# Patient Record
Sex: MALE | Employment: FULL TIME | URBAN - METROPOLITAN AREA
[De-identification: names, ages, dates, MRNs, and addresses within clinical notes are randomized per-mention and may not be internally consistent; named-entity substitution may affect disease eponyms.]

---

## 2024-03-02 ENCOUNTER — HOSPITAL ENCOUNTER (OUTPATIENT)
Age: 54
Discharge: HOME | End: 2024-03-02
Attending: EMERGENCY MEDICINE
Payer: COMMERCIAL

## 2024-03-02 VITALS
RESPIRATION RATE: 20 BRPM | OXYGEN SATURATION: 97 % | HEART RATE: 56 BPM | DIASTOLIC BLOOD PRESSURE: 72 MMHG | SYSTOLIC BLOOD PRESSURE: 113 MMHG | TEMPERATURE: 98 F

## 2024-03-02 DIAGNOSIS — H60.502 ACUTE OTITIS EXTERNA OF LEFT EAR, UNSPECIFIED TYPE: Primary | ICD-10-CM

## 2024-03-02 PROCEDURE — 99203 OFFICE O/P NEW LOW 30 MIN: CPT

## 2024-03-02 RX ORDER — CIPROFLOXACIN AND DEXAMETHASONE 3; 1 MG/ML; MG/ML
4 SUSPENSION/ DROPS AURICULAR (OTIC) 2 TIMES DAILY
Qty: 7.5 ML | Refills: 0 | Status: SHIPPED | OUTPATIENT
Start: 2024-03-02 | End: 2024-03-09

## 2024-03-02 NOTE — ED PROVIDER NOTES
Patient Seen in: [facility]      History     Chief Complaint   Patient presents with    Ear Problem Pain     Stated Complaint: R ear pain    Subjective:   HPI    55 yo male with chronic left ear pain, this episode started 4 days ago. No illness or trauma. No change in hearing. No other complaints.     Objective:   History reviewed. No pertinent past medical history.           History reviewed. No pertinent surgical history.             Social History     Socioeconomic History    Marital status:               Review of Systems    Positive for stated complaint: R ear pain  Other systems are as noted in HPI.  Constitutional and vital signs reviewed.      All other systems reviewed and negative except as noted above.    Physical Exam     ED Triage Vitals [03/02/24 1034]   /72   Pulse 56   Resp 20   Temp 98 °F (36.7 °C)   Temp src Temporal   SpO2 97 %   O2 Device None (Room air)       Current:/72   Pulse 56   Temp 98 °F (36.7 °C) (Temporal)   Resp 20   SpO2 97%         Physical Exam  Vitals and nursing note reviewed.   Constitutional:       Appearance: Normal appearance. He is well-developed.   HENT:      Head: Normocephalic and atraumatic.      Right Ear: Tympanic membrane, ear canal and external ear normal.      Ears:      Comments: Left ear canal is erythematous, swollen and tender. TM is normal.   Cardiovascular:      Rate and Rhythm: Normal rate and regular rhythm.   Pulmonary:      Effort: Pulmonary effort is normal. No respiratory distress.   Skin:     General: Skin is warm and dry.      Capillary Refill: Capillary refill takes less than 2 seconds.   Neurological:      General: No focal deficit present.      Mental Status: He is alert.      Sensory: No sensory deficit.   Psychiatric:         Mood and Affect: Mood normal.         Behavior: Behavior normal.              ED Course   Labs Reviewed - No data to display                   MDM                                       Medical Decision Making  Otitis media, otitis externa, cerumen all in differential. Exam findings consistent with otitis externa. Discharge on ciprodex and otc ibuprofen for pain.     Disposition and Plan     Clinical Impression:  1. Acute otitis externa of left ear, unspecified type         Disposition:  Discharge  3/2/2024 10:38 am    Follow-up:  Suzy Sanders MD  [address]  [place] [address]  [phone]      As needed          Medications Prescribed:  Discharge Medication List as of 3/2/2024 10:39 AM        START taking these medications    Details   ciprofloxacin-dexamethasone 0.3-0.1 % Otic Suspension Place 4 drops into the left ear 2 (two) times daily for 7 days., Print, Disp-7.5 mL, R-0

## 2024-03-14 ENCOUNTER — OFFICE VISIT (OUTPATIENT)
Dept: OTOLARYNGOLOGY | Facility: CLINIC | Age: 54
End: 2024-03-14

## 2024-03-14 VITALS — TEMPERATURE: 98 F

## 2024-03-14 DIAGNOSIS — M26.609 TMJ (TEMPOROMANDIBULAR JOINT DISORDER): Primary | ICD-10-CM

## 2024-03-14 DIAGNOSIS — H92.02 LEFT EAR PAIN: ICD-10-CM

## 2024-03-14 NOTE — PROGRESS NOTES
Henrique Simmons is a 54 year old male.   Chief Complaint   Patient presents with    Ear Problem     Pt presents with left ear pain for 1 week. Was seen in [facility] on 3/2 was given Cipro/Dexameth. Did not complete treatment due to no improvement.       ASSESSMENT AND PLAN:   1. TMJ (temporomandibular joint disorder)      2. Left ear pain  54-year-old presents with severe left ear pain.  He notes a couple dental procedures in the last couple weeks.  This primarily bothers him at nighttime.  He has used some Ciprodex drops    His otologic exam is normal there is no effusion or otitis of the ear canal or middle ear space.  He did have tenderness of the left temporomandibular joint    Reassured him of the normal ear exam.  Rather his ear pain may be referred from his job with respect to his previous dental surgeries in the recent past.  He may want to try 600 mg of ibuprofen prior to sleep as well as warm compress and soft diet.  He is going to be following up again with his dentist regarding this as well.      The patient indicates understanding of these issues and agrees to the plan.      EXAM:   Temp 97.5 °F (36.4 °C) (Tympanic)     Pertinent exam findings may also be noted above in assessment and plan     System Details   Skin Inspection - Normal.   Constitutional Overall appearance - Normal.   Head/Face Symmetric, TMJ tenderness not present    Eyes EOMI, PERRL   Right ear:  Canal clear, TM intact, no GUILLE   Left ear:  Canal clear, TM intact, no GUILLE   Nose: Septum midline, inferior turbinates not enlarged, nasal valves without collapse    Oral cavity/Oropharynx: No lesions or masses on inspection or palpation, tonsils symmetric    Neck: Soft without LAD, thyroid not enlarged  Voice clear/ no stridor   Other:      Scopes and Procedures:             No current outpatient medications on file.      History reviewed. No pertinent past medical history.   Social History:  Social History     Socioeconomic History    Marital  status:    Tobacco Use    Smoking status: Every Day     Packs/day: .5     Types: Cigarettes    Smokeless tobacco: Never   Substance and Sexual Activity    Alcohol use: Not Currently          Klever Hardy MD  3/14/2024  3:06 PM

## 2025-05-23 ENCOUNTER — OFFICE VISIT (OUTPATIENT)
Dept: OCCUPATIONAL MEDICINE | Age: 55
End: 2025-05-23
Attending: NURSE PRACTITIONER

## 2025-05-23 DIAGNOSIS — Z00.00 ROUTINE GENERAL MEDICAL EXAMINATION AT A HEALTH CARE FACILITY: Primary | ICD-10-CM

## 2025-05-23 LAB
HBV SURFACE AB SER QL: NONREACTIVE
HBV SURFACE AB SERPL IA-ACNC: <3.1 MIU/ML

## 2025-05-23 PROCEDURE — 86706 HEP B SURFACE ANTIBODY: CPT
